# Patient Record
Sex: MALE | Race: WHITE | NOT HISPANIC OR LATINO | Employment: UNEMPLOYED | URBAN - METROPOLITAN AREA
[De-identification: names, ages, dates, MRNs, and addresses within clinical notes are randomized per-mention and may not be internally consistent; named-entity substitution may affect disease eponyms.]

---

## 2017-09-14 ENCOUNTER — OFFICE VISIT (OUTPATIENT)
Dept: INTERNAL MEDICINE | Facility: CLINIC | Age: 32
End: 2017-09-14
Attending: FAMILY MEDICINE
Payer: MEDICAID

## 2017-09-14 VITALS
TEMPERATURE: 99 F | SYSTOLIC BLOOD PRESSURE: 128 MMHG | BODY MASS INDEX: 29.99 KG/M2 | HEIGHT: 68 IN | HEART RATE: 128 BPM | WEIGHT: 197.88 LBS | DIASTOLIC BLOOD PRESSURE: 82 MMHG | OXYGEN SATURATION: 97 %

## 2017-09-14 DIAGNOSIS — G47.00 INSOMNIA, UNSPECIFIED TYPE: ICD-10-CM

## 2017-09-14 DIAGNOSIS — Q79.60 EHLERS-DANLOS DISEASE: Primary | ICD-10-CM

## 2017-09-14 DIAGNOSIS — F33.9 EPISODE OF RECURRENT MAJOR DEPRESSIVE DISORDER, UNSPECIFIED DEPRESSION EPISODE SEVERITY: ICD-10-CM

## 2017-09-14 PROCEDURE — 99202 OFFICE O/P NEW SF 15 MIN: CPT | Mod: S$PBB,,, | Performed by: STUDENT IN AN ORGANIZED HEALTH CARE EDUCATION/TRAINING PROGRAM

## 2017-09-14 PROCEDURE — 99204 OFFICE O/P NEW MOD 45 MIN: CPT | Mod: PBBFAC,PO | Performed by: STUDENT IN AN ORGANIZED HEALTH CARE EDUCATION/TRAINING PROGRAM

## 2017-09-14 PROCEDURE — 3008F BODY MASS INDEX DOCD: CPT | Mod: ,,, | Performed by: STUDENT IN AN ORGANIZED HEALTH CARE EDUCATION/TRAINING PROGRAM

## 2017-09-14 PROCEDURE — 99999 PR PBB SHADOW E&M-NEW PATIENT-LVL IV: CPT | Mod: PBBFAC,,, | Performed by: STUDENT IN AN ORGANIZED HEALTH CARE EDUCATION/TRAINING PROGRAM

## 2017-09-14 RX ORDER — TRAMADOL HYDROCHLORIDE 50 MG/1
50 TABLET ORAL EVERY 8 HOURS PRN
Qty: 60 TABLET | Refills: 1 | Status: SHIPPED | OUTPATIENT
Start: 2017-09-14

## 2017-09-14 RX ORDER — AMITRIPTYLINE HYDROCHLORIDE 100 MG/1
100 TABLET ORAL NIGHTLY
Qty: 30 TABLET | Refills: 6 | Status: SHIPPED | OUTPATIENT
Start: 2017-09-14 | End: 2018-09-14

## 2017-09-14 RX ORDER — MELOXICAM 15 MG/1
15 TABLET ORAL DAILY
Qty: 30 TABLET | Refills: 3 | Status: SHIPPED | OUTPATIENT
Start: 2017-09-14 | End: 2018-01-29 | Stop reason: SDUPTHER

## 2017-09-14 RX ORDER — DULOXETIN HYDROCHLORIDE 60 MG/1
60 CAPSULE, DELAYED RELEASE ORAL DAILY
Qty: 30 CAPSULE | Refills: 4 | Status: SHIPPED | OUTPATIENT
Start: 2017-09-14 | End: 2018-04-19 | Stop reason: SDUPTHER

## 2017-09-14 RX ORDER — LIDOCAINE 50 MG/G
1 PATCH TOPICAL DAILY
Qty: 30 PATCH | Refills: 3 | Status: SHIPPED | OUTPATIENT
Start: 2017-09-14

## 2017-09-15 PROBLEM — Q79.60 EHLERS-DANLOS DISEASE: Status: ACTIVE | Noted: 2017-09-15

## 2017-09-15 PROBLEM — G47.00 INSOMNIA: Status: ACTIVE | Noted: 2017-09-15

## 2017-09-15 PROBLEM — F33.9 EPISODE OF RECURRENT MAJOR DEPRESSIVE DISORDER: Status: ACTIVE | Noted: 2017-09-15

## 2017-09-15 NOTE — PROGRESS NOTES
HISTORY & PHYSICAL  Ambulatory Clinic      Patient Name: Lance Mcgee  YOB: 1985    PRESENTING HISTORY     Chief Complaint:   Chief Complaint   Patient presents with    Establish Care         History of Present Illness:  Mr. Lance Mcgee is a 31 y.o. male w/ PMHx of EDS who presents requesting a referral to pain management and rheumatology. Pt explains he was previously being seen by a PCP closer to home in [place] but that physician left and the midlevel that took his place was unable to offer the same services. Specifically pt and his mother who presents with him explain he was diagnosed w/ EDS type 3 years ago and a few years ago began to develop worsening pain in joints. The pain has been controlled w/ tramadol and buprenorphine patches. Pt explains w/ the difficulty getting refills he went through W/Ds and now wants to avoid restarting buprenorphine unless he can get it on a consistent basis. Furthermore they would prefer a permanent solution and second opinion with pain management if possible. When questioned about other analgesic medications pt and mother state he reacts very poorly to opioids and gets loopy.     Review of Systems:  General:  Denies weight loss, fever, chills, weakness, fatigue  HEENT:  Denies vision changes, sore throat, congestion  CVS:  Denies chest pain, pressure, palpitations  Resp:  Denies shortness of breath, cough, sputum  GI:  Denies diarrhea, constipation, abdominal pain, nausea, vomiting  :  Denies dysuria, hematuria, nocturia  MSK:  Denies myalgias, arthralgias  Skin:  Denies rashes, bleeding  Neuro:  Denies headache, dizziness, syncope, numbness, paresthesias  All other systems otherwise negative    PAST HISTORY:     No past medical history on file.    No past surgical history on file.    No family history on file.    Social History     Social History    Marital status: Single     Spouse name: N/A    Number of children: N/A    Years of  "education: N/A     Social History Main Topics    Smoking status: Never Smoker    Smokeless tobacco: None    Alcohol use None    Drug use: Unknown    Sexual activity: Not Asked     Other Topics Concern    None     Social History Narrative    None       MEDICATIONS & ALLERGIES:       No current outpatient prescriptions on file prior to visit.     No current facility-administered medications on file prior to visit.        Review of patient's allergies indicates:  No Known Allergies    OBJECTIVE:     Vital Signs:  Vitals:    09/14/17 1527   BP: 128/82   Pulse: (!) 128   Temp: 98.8 °F (37.1 °C)   SpO2: 97%   Weight: 89.8 kg (197 lb 13.8 oz)   Height: 5' 8" (1.727 m)     /82 (BP Location: Left arm, Patient Position: Sitting, BP Method: Large (Manual))   Pulse (!) 128   Temp 98.8 °F (37.1 °C)   Ht 5' 8" (1.727 m)   Wt 89.8 kg (197 lb 13.8 oz)   SpO2 97%   BMI 30.08 kg/m²   Body mass index is 30.08 kg/m².       Physical Exam:  General:  Well developed, well nourished, no acute distress  HEENT:  Normocephalic, atraumatic, PERRL, EOMI  CVS:  RRR, S1 and S2 normal, no murmurs, rubs, gallops  Resp:  Lungs clear to auscultation, no wheezes, rales, rhonchi, cough  GI:  Abdomen soft, non-tender, non-distended, normoactive bowel sounds, no masses  MSK:  No muscle atrophy, cyanosis, peripheral edema  Skin:  No rashes, ulcers, erythema  Neuro:  CNII-XII grossly intact  Psych:  Alert and oriented to person, place, and time    Laboratory:        ASSESSMENT & PLAN:   Mr. Lance Mcgee is a 31 y.o. male w/ CC of EDS      1. Jeramy-Danlos disease  - Ambulatory Referral to Pain Clinic  - Ambulatory Referral to Rheumatology  - meloxicam (MOBIC) 15 MG tablet; Take 1 tablet (15 mg total) by mouth once daily.  Dispense: 30 tablet; Refill: 3  - tramadol (ULTRAM) 50 mg tablet; Take 1 tablet (50 mg total) by mouth every 8 (eight) hours as needed for Pain.  Dispense: 60 tablet; Refill: 1  - lidocaine (LIDODERM) 5 %; Place " 1 patch onto the skin once daily. Remove & Discard patch within 12 hours or as directed by MD  Dispense: 30 patch; Refill: 3  - duloxetine (CYMBALTA) 60 MG capsule; Take 1 capsule (60 mg total) by mouth once daily.  Dispense: 30 capsule; Refill: 4    2. Insomnia, unspecified type  - amitriptyline (ELAVIL) 100 MG tablet; Take 1 tablet (100 mg total) by mouth every evening.  Dispense: 30 tablet; Refill: 6    3. Episode of recurrent major depressive disorder, unspecified depression episode severity  - duloxetine (CYMBALTA) 60 MG capsule; Take 1 capsule (60 mg total) by mouth once daily.  Dispense: 30 capsule; Refill: 4      Pt seen and plan discussed w/ Dr. Carlson    It was a pleasure caring for this patient    Javid Valencia M.D.  IM PGY3

## 2017-11-14 ENCOUNTER — OFFICE VISIT (OUTPATIENT)
Dept: INTERNAL MEDICINE | Facility: CLINIC | Age: 32
End: 2017-11-14
Attending: FAMILY MEDICINE
Payer: MEDICAID

## 2017-11-14 VITALS
TEMPERATURE: 98 F | SYSTOLIC BLOOD PRESSURE: 112 MMHG | HEIGHT: 68 IN | BODY MASS INDEX: 31.21 KG/M2 | DIASTOLIC BLOOD PRESSURE: 86 MMHG | OXYGEN SATURATION: 97 % | HEART RATE: 116 BPM | WEIGHT: 205.94 LBS

## 2017-11-14 DIAGNOSIS — Q79.60 EHLERS-DANLOS DISEASE: Primary | ICD-10-CM

## 2017-11-14 PROCEDURE — 99999 PR PBB SHADOW E&M-EST. PATIENT-LVL III: CPT | Mod: PBBFAC,,, | Performed by: INTERNAL MEDICINE

## 2017-11-14 PROCEDURE — 99213 OFFICE O/P EST LOW 20 MIN: CPT | Mod: S$PBB,,, | Performed by: INTERNAL MEDICINE

## 2017-11-14 PROCEDURE — 99213 OFFICE O/P EST LOW 20 MIN: CPT | Mod: PBBFAC,PO | Performed by: INTERNAL MEDICINE

## 2017-11-14 NOTE — PROGRESS NOTES
Subjective:       Patient ID: Lance Mcgee is a 31 y.o. male.    Chief Complaint: Follow-up (2 month)    HPI the patient is a 31-year-old male who comes in for follow-up of his Jeramy Danlos syndrome.  The patient reports pain in multiple joints related to frequent subluxations.  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  He spends most of his time at home walking about the house.  He is not getting out much to do much in the way of any other physical activity.  Joints with stent to sublux the most include his knees, wrists, elbows, and fingers.  He has follow-up with the rheumatologist since his last visit here.  No new recommendations were made by the rheumatologist.  We discussed cardiac evaluation as well.  He had a cardiac evaluation about 2 years ago.  He reports that his echocardiogram was normal indicating no difficulty with valvular heart disease or his aorta.  Review of Systems   Constitutional: Negative.  Negative for activity change and unexpected weight change.   Respiratory: Negative for chest tightness and shortness of breath.    Cardiovascular: Negative for chest pain and palpitations.   Musculoskeletal: Positive for arthralgias.       Objective:      Physical Exam   Constitutional: He appears well-developed and well-nourished. No distress.   Cardiovascular: Regular rhythm and normal heart sounds.  Exam reveals no gallop and no friction rub.    No murmur heard.  Tachycardia noted   Pulmonary/Chest: Effort normal and breath sounds normal. No respiratory distress. He has no wheezes. He has no rales.   Skin: He is not diaphoretic.   Vitals reviewed.      Assessment:       1. Jeramy-Danlos disease      we discussed engaging in physical therapy to tighten up his joints to prevent subluxations.  He agrees to proceed.  He may also be a candidate for some braces particularly of his knees to help prevent knee subluxation.  Plan:       1.   Refer to physical therapy  2.  Return to clinic 4 months

## 2017-11-29 ENCOUNTER — TELEPHONE (OUTPATIENT)
Dept: INTERNAL MEDICINE | Facility: CLINIC | Age: 32
End: 2017-11-29

## 2017-11-29 NOTE — TELEPHONE ENCOUNTER
Spoke with patient's grandmother, states patient was still sleeping. Left message for patient to return call. PT referral faxed to [facility]. Confirmed patient's insurance is accepted. Phone # [phone] Fax # [phone].

## 2018-01-29 DIAGNOSIS — Q79.60 EHLERS-DANLOS DISEASE: ICD-10-CM

## 2018-01-29 RX ORDER — MELOXICAM 15 MG/1
15 TABLET ORAL DAILY
Qty: 30 TABLET | Refills: 3 | Status: SHIPPED | OUTPATIENT
Start: 2018-01-29

## 2018-04-19 DIAGNOSIS — F33.9 EPISODE OF RECURRENT MAJOR DEPRESSIVE DISORDER, UNSPECIFIED DEPRESSION EPISODE SEVERITY: ICD-10-CM

## 2018-04-19 DIAGNOSIS — Q79.60 EHLERS-DANLOS DISEASE: ICD-10-CM

## 2018-04-19 RX ORDER — DULOXETIN HYDROCHLORIDE 60 MG/1
60 CAPSULE, DELAYED RELEASE ORAL DAILY
Qty: 90 CAPSULE | Refills: 0 | Status: SHIPPED | OUTPATIENT
Start: 2018-04-19 | End: 2019-04-19

## 2021-05-12 ENCOUNTER — PATIENT MESSAGE (OUTPATIENT)
Dept: RESEARCH | Facility: HOSPITAL | Age: 36
End: 2021-05-12